# Patient Record
Sex: FEMALE | Race: WHITE
[De-identification: names, ages, dates, MRNs, and addresses within clinical notes are randomized per-mention and may not be internally consistent; named-entity substitution may affect disease eponyms.]

---

## 2020-06-13 ENCOUNTER — HOSPITAL ENCOUNTER (EMERGENCY)
Dept: HOSPITAL 11 - JP.ED | Age: 60
Discharge: HOME | End: 2020-06-13
Payer: COMMERCIAL

## 2020-06-13 DIAGNOSIS — Z88.0: ICD-10-CM

## 2020-06-13 DIAGNOSIS — Z88.2: ICD-10-CM
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
## 2020-06-13 DIAGNOSIS — S51.811A: Primary | ICD-10-CM

## 2020-06-13 NOTE — EDM.PDOC
ED HPI GENERAL MEDICAL PROBLEM





- General


Chief Complaint: Laceration


Stated Complaint: RT FOREARM LACERATION


Time Seen by Provider: 06/13/20 15:25


Source of Information: Reports: Patient


History Limitations: Reports: No Limitations





- History of Present Illness


INITIAL COMMENTS - FREE TEXT/NARRATIVE: 





Patient presents for evaluation of a laceration sustained to the anterior right 

forearm after tripping and striking that arm against the sharp edge of a stair. 

There was moderate bleeding at that time and she applied direct pressure. She 

jammed the end of her left little finger and also had a minimal contusion 

contact with the step with her right maxilla those areas are minimally 

uncomfortable. Her main concern is the laceration she is up-to-date with her 

tetanus immunization.


Onset: Today


Location: Reports: Upper Extremity, Right


Quality: Reports: Ache


Severity: Mild


Improves with: Reports: None


Worsens with: Reports: Movement


Context: Reports: Trauma


Associated Symptoms: Reports: No Other Symptoms





- Related Data


 Allergies











Allergy/AdvReac Type Severity Reaction Status Date / Time


 


Penicillins Allergy  Airway Verified 06/13/20 15:34





   Tightness  


 


Sulfa (Sulfonamide Allergy  Hives Verified 06/13/20 15:34





Antibiotics)     











Home Meds: 


 Home Meds





NK [No Known Home Meds]  06/13/20 [History]











ED ROS GENERAL





- Review of Systems


Review Of Systems: Comprehensive ROS is negative, except as noted in HPI.





ED EXAM, SKIN/RASH


Exam: See Below


Text/Narrative:: 





This is an adult female sitting with her forearm bandaged on the cart in room 7.


Exam Limited By: No Limitations


General Appearance: Alert


Respiratory/Chest: No Respiratory Distress


Cardiovascular: Regular Rate, Rhythm


Extremities: Other (There is a slightly irregular bordered curvilinear 4 cm 

superficial laceration to the right anterior forearm. The wound gaps almost 0.5 

cm at rest. Bleeding is controlled at this time. You can see fascia when 

looking into the wound but it has not been compromised.)


Neurological: No Motor/Sensory Deficits


Skin: Wound/Incision (As noted above in extremities.)





ED SKIN PROCEDURES





- Laceration/Wound Repair


  ** Right Anterior Arm


Appearance: Superficial, Clean


Distal NVT: Neuro & Vascular Intact


Anesthetic Type: Local


Local Anesthesia - Lidocaine (Xylocaine): 1% with EPI


Skin Prep: Saline


Saline Irrigation (cc's): 250


Exploration/Debridement/Repair: Wound Explored, Foreign Material Removed


Closed with: Sutures


Lac/Wound length In cm: 4


Suture Size: 4-0


# of Sutures: 7


Suture Type: Nylon, Interrupted


Sterile Dressing Applied: Nurse


Tetanus Status Addressed: Yes


Complications: No





Course





- Vital Signs


Last Recorded V/S: 


 Last Vital Signs











Temp  35.1 C L  06/13/20 15:37


 


Pulse  70   06/13/20 15:37


 


Resp  16   06/13/20 15:37


 


BP  129/69   06/13/20 15:37


 


Pulse Ox  97   06/13/20 15:37














- Orders/Labs/Meds


Orders: 


 Active Orders 24 hr











 Category Date Time Status


 


 Bacitracin [Bacitracin Oint 1 GM] Med  06/13/20 16:15 Once





 1 dose TOP ONETIME ONE   


 


 Lidocaine 1% w/EPINEPHrine [Xylocaine 1% with Med  06/13/20 15:45 Ordered





 EPINEPHrine 1:100,000]   





 2 ml INFILT ASDIRECTED   








 Medication Orders





Bacitracin (Bacitracin Oint 1 Gm)  1 dose TOP ONETIME ONE


   Stop: 06/13/20 16:16


Lidocaine/Epinephrine (Xylocaine 1% With Epinephrine 1:100,000)  2 ml INFILT 

ASDIRECTED [facility]


   Last Admin: 06/13/20 15:57  Dose: 2 ml








Meds: 


Medications











Generic Name Dose Route Start Last Admin





  Trade Name Freq  PRN Reason Stop Dose Admin


 


Bacitracin  1 dose  06/13/20 16:15  





  Bacitracin Oint 1 Gm  TOP  06/13/20 16:16  





  ONETIME ONE   





     





     





     





     


 


Lidocaine/Epinephrine  2 ml  06/13/20 15:45  06/13/20 15:57





  Xylocaine 1% With Epinephrine 1:100,000  INFILT   2 ml





  ASDIRECTED [facility]   Administration





     





     





     





     














- Re-Assessments/Exams


Free Text/Narrative Re-Assessment/Exam: 





06/13/20 16:28


The wound was anesthetized, irrigated, repaired. See related note elsewhere in 

this note. Bacitracin was applied and a bandage overlaid. The sutures, 7 of them

, should remain in for 10 days. The first dressing should remain dry and intact 

for the first 3 days. She can then remove it, use water cleansing, apply 

bacitracin or Neosporin daily and a Band-Aid covering until sutures removed. 

Prescriptions sent for clindamycin 300 mg, 15 capsules; take 3 times daily 

until gone. Watch for signs of redness, pus development, lymphangitis. Return 

to ER with any concerns.


06/13/20 16:30








Departure





- Departure


Time of Disposition: 16:14


Disposition: Home, Self-Care 01


Condition: Good


Clinical Impression: 


Laceration of forearm, right


Qualifiers:


 Encounter type: initial encounter Qualified Code(s): S51.811A - Laceration 

without foreign body of right forearm, initial encounter








- Discharge Information


*PRESCRIPTION DRUG MONITORING PROGRAM REVIEWED*: Not Applicable


*COPY OF PRESCRIPTION DRUG MONITORING REPORT IN PATIENT [facility]: Not Applicable


Referrals: 


PCP,None [Primary Care Provider] - 


Forms:  ED Department Discharge


Additional Instructions: 


Leave these stitches (7 of them) in for the next 10 days. Leave this first 

bandage on and keep it dry until Wednesday morning, 18 June. After that time, 

plain soap and water washing is fine. You can apply bacitracin or Neosporin 

ointment along the stitches and cover with a Band-Aid every day until the 

stitches are removed. He'll be started on clindamycin and, an antibiotic to use 

for 5 days. Watch for any signs of increased pain, redness, drainage, or red 

streaks going up the arm. Recheck here or with primary care if noticing those 

items.





For your mother, Google "Power Pudding" recipes and review them with your 

mother to create something tasty that she could use as a constipation prevent 

her every day. If she needs to get "flushed out" having her use a model of 

magnesium citrate solution (nonprescription) would begin at the top and work 

its way through all of the intestines. Hopefully that will help her 

constipation. Return to ER with any additional concerns.





Sepsis Event Note (ED)





- Focused Exam


Vital Signs: 


 Vital Signs











  Temp Pulse Resp BP Pulse Ox


 


 06/13/20 15:37  35.1 C L  70  16  129/69  97


 


 06/13/20 15:21  35.1 C L  70  16  129/69  97














- My Orders


Last 24 Hours: 


My Active Orders





06/13/20 15:45


Lidocaine 1% w/EPINEPHrine [Xylocaine 1% with EPINEPHrine 1:100,000]   2 ml 

INFILT ASDIRECTED 





06/13/20 16:15


Bacitracin [Bacitracin Oint 1 GM]   1 dose TOP ONETIME ONE 














- Assessment/Plan


Last 24 Hours: 


My Active Orders





06/13/20 15:45


Lidocaine 1% w/EPINEPHrine [Xylocaine 1% with EPINEPHrine 1:100,000]   2 ml 

INFILT ASDIRECTED 





06/13/20 16:15


Bacitracin [Bacitracin Oint 1 GM]   1 dose TOP ONETIME ONE